# Patient Record
(demographics unavailable — no encounter records)

---

## 2025-03-07 NOTE — PLAN
[FreeTextEntry1] :  Patient screened for depression- no signs of clinical depression.  PHQ-9 scores reviewed over the course of the visit  5-10 minutes of face to face time spent.  Follow up with changes in mood including other symptoms of anxiety. PT WILL EMAIL RESULTS OF MAMMO AND BDT TO OFFICE

## 2025-03-07 NOTE — HISTORY OF PRESENT ILLNESS
[Mammogramdate] : 8/24- TO PMD [PapSmeardate] : 2023 [BoneDensityDate] : 2022 [ColonoscopyDate] : 2022